# Patient Record
Sex: FEMALE | URBAN - METROPOLITAN AREA
[De-identification: names, ages, dates, MRNs, and addresses within clinical notes are randomized per-mention and may not be internally consistent; named-entity substitution may affect disease eponyms.]

---

## 2024-08-22 ENCOUNTER — TELEPHONE (OUTPATIENT)
Dept: PLASTIC SURGERY | Facility: CLINIC | Age: 19
End: 2024-08-22

## 2024-08-22 NOTE — TELEPHONE ENCOUNTER
[facility] Call Center    Phone Message    May a detailed message be left on voicemail: yes     Reason for Call: Other: Online request: Gender surgery, (FTM) - Dr. Cintron      Action Taken: Message routed to:  [facility]): Gender care    Travel Screening: Not Applicable     Date of Service:

## 2024-08-23 NOTE — CONFIDENTIAL NOTE
Writer called pt and relayed that we aren't taking new pts for DI mastectomy. Writer added pt to Dr. Cintron's 2026 consult waitlist per pt request.

## 2024-10-07 ENCOUNTER — TELEPHONE (OUTPATIENT)
Dept: PLASTIC SURGERY | Facility: CLINIC | Age: 19
End: 2024-10-07

## 2024-10-07 NOTE — CONFIDENTIAL NOTE
Writer called to offer top surgery consult with Dr. Cintron as pt is on her consult waitlist. Pt is already scheduled with Dr. Sainz at [facility] and declined an appt.